# Patient Record
Sex: MALE | Race: WHITE
[De-identification: names, ages, dates, MRNs, and addresses within clinical notes are randomized per-mention and may not be internally consistent; named-entity substitution may affect disease eponyms.]

---

## 2018-06-08 ENCOUNTER — HOSPITAL ENCOUNTER (EMERGENCY)
Dept: HOSPITAL 72 - EMR | Age: 28
Discharge: HOME | End: 2018-06-08
Payer: SELF-PAY

## 2018-06-08 VITALS — DIASTOLIC BLOOD PRESSURE: 81 MMHG | SYSTOLIC BLOOD PRESSURE: 136 MMHG

## 2018-06-08 VITALS — SYSTOLIC BLOOD PRESSURE: 128 MMHG | DIASTOLIC BLOOD PRESSURE: 77 MMHG

## 2018-06-08 VITALS — DIASTOLIC BLOOD PRESSURE: 94 MMHG | SYSTOLIC BLOOD PRESSURE: 140 MMHG

## 2018-06-08 VITALS — HEIGHT: 66 IN | WEIGHT: 180 LBS | BODY MASS INDEX: 28.93 KG/M2

## 2018-06-08 DIAGNOSIS — R10.9: Primary | ICD-10-CM

## 2018-06-08 DIAGNOSIS — R16.0: ICD-10-CM

## 2018-06-08 DIAGNOSIS — R19.7: ICD-10-CM

## 2018-06-08 DIAGNOSIS — K76.0: ICD-10-CM

## 2018-06-08 DIAGNOSIS — R11.2: ICD-10-CM

## 2018-06-08 DIAGNOSIS — R74.0: ICD-10-CM

## 2018-06-08 LAB
ADD MANUAL DIFF: NO
ALBUMIN SERPL-MCNC: 4.3 G/DL (ref 3.4–5)
ALBUMIN/GLOB SERPL: 1.2 {RATIO} (ref 1–2.7)
ALP SERPL-CCNC: 97 U/L (ref 46–116)
ALT SERPL-CCNC: 79 U/L (ref 12–78)
ANION GAP SERPL CALC-SCNC: 12 MMOL/L (ref 5–15)
APPEARANCE UR: CLEAR
APTT PPP: (no result) S
AST SERPL-CCNC: 68 U/L (ref 15–37)
BASOPHILS NFR BLD AUTO: 0.7 % (ref 0–2)
BILIRUB SERPL-MCNC: 1 MG/DL (ref 0.2–1)
BUN SERPL-MCNC: 11 MG/DL (ref 7–18)
CALCIUM SERPL-MCNC: 9.3 MG/DL (ref 8.5–10.1)
CHLORIDE SERPL-SCNC: 101 MMOL/L (ref 98–107)
CO2 SERPL-SCNC: 26 MMOL/L (ref 21–32)
CREAT SERPL-MCNC: 0.9 MG/DL (ref 0.55–1.3)
EOSINOPHIL NFR BLD AUTO: 0.1 % (ref 0–3)
ERYTHROCYTE [DISTWIDTH] IN BLOOD BY AUTOMATED COUNT: 12 % (ref 11.6–14.8)
GLOBULIN SER-MCNC: 3.6 G/DL
GLUCOSE UR STRIP-MCNC: NEGATIVE MG/DL
HCT VFR BLD CALC: 44.6 % (ref 42–52)
HGB BLD-MCNC: 15.8 G/DL (ref 14.2–18)
KETONES UR QL STRIP: NEGATIVE
LEUKOCYTE ESTERASE UR QL STRIP: NEGATIVE
LYMPHOCYTES NFR BLD AUTO: 12.4 % (ref 20–45)
MCV RBC AUTO: 85 FL (ref 80–99)
MONOCYTES NFR BLD AUTO: 12.2 % (ref 1–10)
NEUTROPHILS NFR BLD AUTO: 74.7 % (ref 45–75)
NITRITE UR QL STRIP: NEGATIVE
PH UR STRIP: 7 [PH] (ref 4.5–8)
PLATELET # BLD: 205 K/UL (ref 150–450)
POTASSIUM SERPL-SCNC: 4 MMOL/L (ref 3.5–5.1)
PROT UR QL STRIP: NEGATIVE
RBC # BLD AUTO: 5.26 M/UL (ref 4.7–6.1)
SODIUM SERPL-SCNC: 139 MMOL/L (ref 136–145)
SP GR UR STRIP: 1.01 (ref 1–1.03)
UROBILINOGEN UR-MCNC: NORMAL MG/DL (ref 0–1)
WBC # BLD AUTO: 9.3 K/UL (ref 4.8–10.8)

## 2018-06-08 PROCEDURE — 83690 ASSAY OF LIPASE: CPT

## 2018-06-08 PROCEDURE — 96375 TX/PRO/DX INJ NEW DRUG ADDON: CPT

## 2018-06-08 PROCEDURE — 80053 COMPREHEN METABOLIC PANEL: CPT

## 2018-06-08 PROCEDURE — 99284 EMERGENCY DEPT VISIT MOD MDM: CPT

## 2018-06-08 PROCEDURE — 76700 US EXAM ABDOM COMPLETE: CPT

## 2018-06-08 PROCEDURE — 81003 URINALYSIS AUTO W/O SCOPE: CPT

## 2018-06-08 PROCEDURE — 74177 CT ABD & PELVIS W/CONTRAST: CPT

## 2018-06-08 PROCEDURE — 36415 COLL VENOUS BLD VENIPUNCTURE: CPT

## 2018-06-08 PROCEDURE — 85025 COMPLETE CBC W/AUTO DIFF WBC: CPT

## 2018-06-08 PROCEDURE — 81025 URINE PREGNANCY TEST: CPT

## 2018-06-08 PROCEDURE — 96374 THER/PROPH/DIAG INJ IV PUSH: CPT

## 2018-06-08 PROCEDURE — 80307 DRUG TEST PRSMV CHEM ANLYZR: CPT

## 2018-06-08 NOTE — DIAGNOSTIC IMAGING REPORT
Indication: Abdominal pain

 

Technique: Continuous helical transaxial imaging of the abdomen and pelvis was

obtained from the lung bases to the pubic symphysis during intravenous contrast

administration. Coronal 2-D reformats were also obtained. Study obtained in a Siemens

sensation 64 slice CT.  Automatic Exposure Control was utilized.

 

Total Dose length Product (DLP):  664.34 mGycm

 

CT Dose Index Volume (CTDIvol):   11.76 mGy

 

Comparison: None

 

Findings: The lung bases are clear. The liver is diffusely hypodense consistent with

fatty infiltration. Craniocaudal measurement of the liver is 20 cm. The spleen is

normal in size. Gallbladder is unremarkable. There is no adrenal mass. Excreted

contrast noted within the ureters bilaterally. Kidneys enhance normally. The pancreas

is unremarkable. Accessory spleen noted. There is no biliary ductal dilatation

appreciated. Normal appendix demonstrated. Bladder is mostly nondistended. No free

fluid or free air identified. No evidence of bowel obstruction.

 

IMPRESSION:

 

Hepatomegaly with fatty infiltration.

 

 

 

The CT scanner at [facility] is accredited by the [employer] College [employer]

[employer]iology and the scans are performed using dose optimization techniques as

appropriate to a performed exam including Automatic Exposure control.

## 2018-06-08 NOTE — EMERGENCY ROOM REPORT
History of Present Illness


General


Chief Complaint:  Abdominal Pain


Source:  Patient





Present Illness


HPI


This patient states that he has had intermittent ongoing symptoms for the past 

6 months.  He states that those symptoms were intermittent abdominal pain and 

cramping.  He states that he went to [facility] yesterday for 

chest pain.  He was told that his workup was negative.  He presents today 

because he's had nausea, vomiting, abdominal pain and diarrhea that is 

progressively worsened over the past few days.  He denies fever or chills.  He 

denies dysuria or hematuria.  He has no other complaints.  He states he uses 

marijuana about once a week.  He occasionally drinks alcohol.


Allergies:  


Coded Allergies:  


     No Known Allergies (Unverified , 6/8/18)





Patient History


Past Medical History:  none


Past Surgical History:  none


Social History:  Reports: alcohol use, drug use; Denies: smoking


Reviewed Nursing Documentation:  PMH: Agreed; PSxH: Agreed





Nursing Documentation-PMH


Past Medical History:  No Stated History





Review of Systems


All Other Systems:  negative except mentioned in HPI





Physical Exam





Vital Signs








  Date Time  Temp Pulse Resp B/P (MAP) Pulse Ox O2 Delivery O2 Flow Rate FiO2


 


6/8/18 09:25 98.2 115 21 140/94 97 Room Air  





 98.2       








Sp02 EP Interpretation:  reviewed, normal


General Appearance:  no apparent distress, alert, GCS 15, non-toxic


Head:  normocephalic, atraumatic


Eyes:  bilateral eye normal inspection, bilateral eye PERRL


ENT:  hearing grossly normal, normal pharynx, no angioedema, normal voice


Neck:  full range of motion, supple/symm/no masses


Respiratory:  chest non-tender, lungs clear, normal breath sounds, no 

respiratory distress, no retraction, no accessory muscle use, speaking full 

sentences


Cardiovascular #1:  regular rate, rhythm, no edema


Gastrointestinal:  normal bowel sounds, soft, non-distended, no guarding, no 

rebound, tenderness - TTP in the RLQ and RUQ and LUQ


Rectal:  deferred


Musculoskeletal:  back normal, gait/station normal, normal range of motion, non-

tender


Neurologic:  alert, oriented x3, responsive, motor strength/tone normal, 

sensory intact, speech normal


Psychiatric:  judgement/insight normal, memory normal, mood/affect normal, no 

suicidal/homicidal ideation


Skin:  normal color, no rash, warm/dry, well hydrated





Medical Decision Making


Diagnostic Impression:  


 Primary Impression:  


 Abdominal pain


 Additional Impressions:  


 Nausea vomiting and diarrhea


 Hepatomegaly


 Fatty liver


 Transaminitis


ER Course


The patient presents with abdominal pain, nausea and vomiting.  He also 

admitted to diarrhea.  The patient and tenderness to palpation in the right 

upper quadrant and epigastrium and right lower quadrant.  I did obtain a right 

upper quadrant ultrasound which showed a fatty liver and gallbladder sludge but 

no other abnormalities.  CT of the abdomen and pelvis also showed a fatty 

liver.  He also is found to have a mild transaminitis.  This is likely viral in 

etiology versus diet related versus a hepatitis.  The patient is nontoxic and 

nonsurgical at this time.  I will treat the patient with medications for acid 

reflux given ongoing symptoms.  I instructed the patient to follow-up with his 

primary care physician for further monitoring of his liver function tests and 

hepatomegaly.  At this time, I did not identify an emergency medical condition.

  The patient was given return precautions and followup instructions.





Laboratory Tests








Test


  6/8/18


09:42


 


White Blood Count


  9.3 K/UL


(4.8-10.8)


 


Red Blood Count


  5.26 M/UL


(4.70-6.10)


 


Hemoglobin


  15.8 G/DL


(14.2-18.0)


 


Hematocrit


  44.6 %


(42.0-52.0)


 


Mean Corpuscular Volume 85 FL (80-99)  


 


Mean Corpuscular Hemoglobin


  30.0 PG


(27.0-31.0)


 


Mean Corpuscular Hemoglobin


Concent 35.4 G/DL


(32.0-36.0)


 


Red Cell Distribution Width


  12.0 %


(11.6-14.8)


 


Platelet Count


  205 K/UL


(150-450)


 


Mean Platelet Volume


  9.8 FL


(6.5-10.1)


 


Neutrophils (%) (Auto)


  74.7 %


(45.0-75.0)


 


Lymphocytes (%) (Auto)


  12.4 %


(20.0-45.0)  L


 


Monocytes (%) (Auto)


  12.2 %


(1.0-10.0)  H


 


Eosinophils (%) (Auto)


  0.1 %


(0.0-3.0)


 


Basophils (%) (Auto)


  0.7 %


(0.0-2.0)


 


Urine Color Pale yellow  


 


Urine Appearance Clear  


 


Urine pH 7 (4.5-8.0)  


 


Urine Specific Gravity


  1.010


(1.005-1.035)


 


Urine Protein


  Negative


(NEGATIVE)


 


Urine Glucose (UA)


  Negative


(NEGATIVE)


 


Urine Ketones


  Negative


(NEGATIVE)


 


Urine Occult Blood


  Negative


(NEGATIVE)


 


Urine Nitrite


  Negative


(NEGATIVE)


 


Urine Bilirubin


  Negative


(NEGATIVE)


 


Urine Urobilinogen


  Normal MG/DL


(0.0-1.0)


 


Urine Leukocyte Esterase


  Negative


(NEGATIVE)


 


Urine HCG, Qualitative


  Negative


(NEGATIVE)


 


Sodium Level


  139 MMOL/L


(136-145)


 


Potassium Level


  4.0 MMOL/L


(3.5-5.1)


 


Chloride Level


  101 MMOL/L


()


 


Carbon Dioxide Level


  26 MMOL/L


(21-32)


 


Anion Gap


  12 mmol/L


(5-15)


 


Blood Urea Nitrogen


  11 mg/dL


(7-18)


 


Creatinine


  0.9 MG/DL


(0.55-1.30)


 


Estimate Glomerular


Filtration Rate > 60 mL/min


(>60)


 


Glucose Level


  113 MG/DL


()  H


 


Calcium Level


  9.3 MG/DL


(8.5-10.1)


 


Total Bilirubin


  1.0 MG/DL


(0.2-1.0)


 


Aspartate Amino Transferase


(AST) 68 U/L (15-37)


H


 


Alanine Aminotransferase (ALT)


  79 U/L (12-78)


H


 


Alkaline Phosphatase


  97 U/L


()


 


Total Protein


  7.9 G/DL


(6.4-8.2)


 


Albumin


  4.3 G/DL


(3.4-5.0)


 


Globulin 3.6 g/dL  


 


Albumin/Globulin Ratio 1.2 (1.0-2.7)  


 


Lipase


  108 U/L


()


 


Urine Opiates Screen


  Negative


(NEGATIVE)


 


Urine Barbiturates Screen


  Negative


(NEGATIVE)


 


Phencyclidine (PCP) Screen


  Negative


(NEGATIVE)


 


Urine Amphetamines Screen


  Negative


(NEGATIVE)


 


Urine Benzodiazepines Screen


  Negative


(NEGATIVE)


 


Urine Cocaine Screen


  Negative


(NEGATIVE)


 


Urine Marijuana (THC) Screen


  Negative


(NEGATIVE)








CT/MRI/US Diagnostic Results


CT/MRI/US Diagnostic Results :  


   Imaging Test Ordered:  RUQ US, CT abd/pelvis


   Impression


This study showed fatty liver and hepatomegaly.  See official reports.





Last Vital Signs








  Date Time  Temp Pulse Resp B/P (MAP) Pulse Ox O2 Delivery O2 Flow Rate FiO2


 


6/8/18 10:58 98.2  21 128/77 97 Room Air  





 98.2       


 


6/8/18 09:25  115      








Status:  improved


Disposition:  HOME, SELF-CARE


Condition:  Improved


Referrals:  


NOT CHOSEN IPA/MD,REFERRING (PCP)











BRADLEY GOLDEN D.O. Jun 8, 2018 11:25

## 2018-06-08 NOTE — DIAGNOSTIC IMAGING REPORT
Indication:Abdominal pain

 

Technique: Grayscale and duplex Doppler imaging of the abdomen performed.

 

Comparison: None

 

Findings:

 

Liver is echogenic consistent with fatty infiltration. On this exam the liver

measures 17 cm. There is a 1.9 cm cyst within the right kidney. There is a linear

echogenic band in the upper pole the right kidney. Correlating with CT, this appears

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 Similar

finding in the upper pole of the left kidney. There are no cysts present by CT. The

current exam would suggest the possibility of cysts within the kidneys, but in

correlating with the CT examination, there are no cysts. The demonstrated part of the

pancreas, gallbladder, aorta and IVC,  spleen appear unremarkable. There is no

biliary ductal dilatation identified. Doppler evaluation of the main portal vein

shows patency. There is no ascites. No hydronephrosis seen.

 

Impression:

 

Hepatomegaly with fatty infiltration.